# Patient Record
Sex: FEMALE | Race: WHITE | Employment: OTHER
[De-identification: names, ages, dates, MRNs, and addresses within clinical notes are randomized per-mention and may not be internally consistent; named-entity substitution may affect disease eponyms.]

---

## 2017-06-03 ENCOUNTER — HEALTH MAINTENANCE LETTER (OUTPATIENT)
Age: 57
End: 2017-06-03

## 2020-04-09 ENCOUNTER — MEDICAL CORRESPONDENCE (OUTPATIENT)
Facility: CLINIC | Age: 60
End: 2020-04-09

## 2020-04-09 ENCOUNTER — TRANSFERRED RECORDS (OUTPATIENT)
Dept: HEALTH INFORMATION MANAGEMENT | Facility: CLINIC | Age: 60
End: 2020-04-09

## 2020-04-10 ENCOUNTER — TELEPHONE (OUTPATIENT)
Dept: MEDSURG UNIT | Facility: CLINIC | Age: 60
End: 2020-04-10

## 2020-04-10 NOTE — TELEPHONE ENCOUNTER
COVID Telephone Screen    Step 1  Patient has been called and travel (COVID) screen has been completed. Yes    The patient is negative for symptoms (fever, cough, sore throat, rash): Yes    If patient is positive for symptoms, do not complete Step 2 and complete Step 3    Step 2  Patient informed of the no visitor policy: Yes  Patient informed to contact ordering provider if the patient develops symptoms (fever, cough, sore throat, rash) prior to procedure (ordering provider to determine if they can still have the procedure or need to reschedule): Yes    If patient is negative for symptoms, STOP here, do not complete Step 3.     Step 3  If the patient is positive for symptoms, consult the ordering provider and/or consult IP to determine if the procedure is deemed necessary or if procedure should be canceled or rescheduled.     If the patient procedure is deemed necessary and the patient is positive for new or worsening symptoms, notify the Manager/Supervisor. The patient should be instructed to call the department and wait by door 2 in their car. A team member in the appropriate PPE will bring a mask to the patient and room the patient directly. The patient will be registered via the phone.

## 2020-04-13 ENCOUNTER — HOSPITAL ENCOUNTER (OUTPATIENT)
Facility: CLINIC | Age: 60
Discharge: HOME | End: 2020-04-13
Admitting: PSYCHIATRY & NEUROLOGY
Payer: COMMERCIAL

## 2020-04-13 ENCOUNTER — APPOINTMENT (OUTPATIENT)
Dept: MRI IMAGING | Facility: CLINIC | Age: 60
End: 2020-04-13
Attending: EMERGENCY MEDICINE
Payer: COMMERCIAL

## 2020-04-13 ENCOUNTER — HOSPITAL ENCOUNTER (OUTPATIENT)
Dept: GENERAL RADIOLOGY | Facility: CLINIC | Age: 60
End: 2020-04-13
Attending: PSYCHIATRY & NEUROLOGY
Payer: COMMERCIAL

## 2020-04-13 ENCOUNTER — HOSPITAL ENCOUNTER (EMERGENCY)
Facility: CLINIC | Age: 60
Discharge: HOME | End: 2020-04-13
Attending: EMERGENCY MEDICINE | Admitting: EMERGENCY MEDICINE
Payer: COMMERCIAL

## 2020-04-13 VITALS
DIASTOLIC BLOOD PRESSURE: 51 MMHG | OXYGEN SATURATION: 99 % | RESPIRATION RATE: 16 BRPM | TEMPERATURE: 98.3 F | BODY MASS INDEX: 18.79 KG/M2 | HEART RATE: 67 BPM | SYSTOLIC BLOOD PRESSURE: 92 MMHG | WEIGHT: 120 LBS

## 2020-04-13 VITALS
HEART RATE: 60 BPM | SYSTOLIC BLOOD PRESSURE: 125 MMHG | OXYGEN SATURATION: 100 % | RESPIRATION RATE: 18 BRPM | DIASTOLIC BLOOD PRESSURE: 72 MMHG | TEMPERATURE: 96.1 F

## 2020-04-13 DIAGNOSIS — R51.9 CHRONIC INTRACTABLE HEADACHE, UNSPECIFIED HEADACHE TYPE: ICD-10-CM

## 2020-04-13 DIAGNOSIS — G03.9 MENINGITIS: ICD-10-CM

## 2020-04-13 DIAGNOSIS — R51.9 CHRONIC HEADACHE: ICD-10-CM

## 2020-04-13 DIAGNOSIS — G89.29 CHRONIC HEADACHE: ICD-10-CM

## 2020-04-13 DIAGNOSIS — C15.3 MALIGNANT NEOPLASM OF UPPER THIRD OF ESOPHAGUS (H): ICD-10-CM

## 2020-04-13 DIAGNOSIS — G89.29 CHRONIC INTRACTABLE HEADACHE, UNSPECIFIED HEADACHE TYPE: ICD-10-CM

## 2020-04-13 LAB
ANION GAP SERPL CALCULATED.3IONS-SCNC: 4 MMOL/L (ref 3–14)
APPEARANCE CSF: CLEAR
BASOPHILS # BLD AUTO: 0 10E9/L (ref 0–0.2)
BASOPHILS NFR BLD AUTO: 0.3 %
BUN SERPL-MCNC: 19 MG/DL (ref 7–30)
CALCIUM SERPL-MCNC: 8.7 MG/DL (ref 8.5–10.1)
CHLORIDE SERPL-SCNC: 104 MMOL/L (ref 94–109)
CO2 SERPL-SCNC: 28 MMOL/L (ref 20–32)
COLOR CSF: COLORLESS
CREAT SERPL-MCNC: 0.7 MG/DL (ref 0.52–1.04)
CRYPTOC AG SPEC QL: NORMAL
DIFFERENTIAL METHOD BLD: ABNORMAL
EOSINOPHIL # BLD AUTO: 0.1 10E9/L (ref 0–0.7)
EOSINOPHIL NFR BLD AUTO: 3.1 %
ERYTHROCYTE [DISTWIDTH] IN BLOOD BY AUTOMATED COUNT: 15 % (ref 10–15)
GFR SERPL CREATININE-BSD FRML MDRD: >90 ML/MIN/{1.73_M2}
GLUCOSE CSF-MCNC: 51 MG/DL (ref 40–70)
GLUCOSE SERPL-MCNC: 83 MG/DL (ref 70–99)
GRAM STN SPEC: NORMAL
HCT VFR BLD AUTO: 33.2 % (ref 35–47)
HGB BLD-MCNC: 10.5 G/DL (ref 11.7–15.7)
IMM GRANULOCYTES # BLD: 0 10E9/L (ref 0–0.4)
IMM GRANULOCYTES NFR BLD: 0 %
LYMPHOCYTES # BLD AUTO: 0.8 10E9/L (ref 0.8–5.3)
LYMPHOCYTES NFR BLD AUTO: 25.5 %
MCH RBC QN AUTO: 27.6 PG (ref 26.5–33)
MCHC RBC AUTO-ENTMCNC: 31.6 G/DL (ref 31.5–36.5)
MCV RBC AUTO: 87 FL (ref 78–100)
MONOCYTES # BLD AUTO: 0.2 10E9/L (ref 0–1.3)
MONOCYTES NFR BLD AUTO: 6.1 %
NEUTROPHILS # BLD AUTO: 2.1 10E9/L (ref 1.6–8.3)
NEUTROPHILS NFR BLD AUTO: 65 %
NRBC # BLD AUTO: 0 10*3/UL
NRBC BLD AUTO-RTO: 0 /100
PLATELET # BLD AUTO: 178 10E9/L (ref 150–450)
POTASSIUM SERPL-SCNC: 4 MMOL/L (ref 3.4–5.3)
PROT CSF-MCNC: 43 MG/DL (ref 15–60)
RBC # BLD AUTO: 3.81 10E12/L (ref 3.8–5.2)
RBC # CSF MANUAL: 0 /UL (ref 0–2)
SODIUM SERPL-SCNC: 136 MMOL/L (ref 133–144)
SPECIMEN SOURCE: NORMAL
SPECIMEN SOURCE: NORMAL
TUBE # CSF: 4 #
WBC # BLD AUTO: 3.3 10E9/L (ref 4–11)
WBC # CSF MANUAL: 0 /UL (ref 0–5)

## 2020-04-13 PROCEDURE — 82784 ASSAY IGA/IGD/IGG/IGM EACH: CPT

## 2020-04-13 PROCEDURE — 82784 ASSAY IGA/IGD/IGG/IGM EACH: CPT | Performed by: PSYCHIATRY & NEUROLOGY

## 2020-04-13 PROCEDURE — 84432 ASSAY OF THYROGLOBULIN: CPT | Performed by: PSYCHIATRY & NEUROLOGY

## 2020-04-13 PROCEDURE — 83873 ASSAY OF CSF PROTEIN: CPT

## 2020-04-13 PROCEDURE — 25800030 ZZH RX IP 258 OP 636: Performed by: EMERGENCY MEDICINE

## 2020-04-13 PROCEDURE — 88185 FLOWCYTOMETRY/TC ADD-ON: CPT | Performed by: PSYCHIATRY & NEUROLOGY

## 2020-04-13 PROCEDURE — 82164 ANGIOTENSIN I ENZYME TEST: CPT

## 2020-04-13 PROCEDURE — 62270 DX LMBR SPI PNXR: CPT

## 2020-04-13 PROCEDURE — 86316 IMMUNOASSAY TUMOR OTHER: CPT | Mod: GZ

## 2020-04-13 PROCEDURE — 86255 FLUORESCENT ANTIBODY SCREEN: CPT | Performed by: PSYCHIATRY & NEUROLOGY

## 2020-04-13 PROCEDURE — 88184 FLOWCYTOMETRY/ TC 1 MARKER: CPT | Performed by: PSYCHIATRY & NEUROLOGY

## 2020-04-13 PROCEDURE — 86334 IMMUNOFIX E-PHORESIS SERUM: CPT | Performed by: PSYCHIATRY & NEUROLOGY

## 2020-04-13 PROCEDURE — 25000128 H RX IP 250 OP 636: Performed by: EMERGENCY MEDICINE

## 2020-04-13 PROCEDURE — A9585 GADOBUTROL INJECTION: HCPCS | Performed by: EMERGENCY MEDICINE

## 2020-04-13 PROCEDURE — 86800 THYROGLOBULIN ANTIBODY: CPT | Performed by: PSYCHIATRY & NEUROLOGY

## 2020-04-13 PROCEDURE — 88108 CYTOPATH CONCENTRATE TECH: CPT

## 2020-04-13 PROCEDURE — 83520 IMMUNOASSAY QUANT NOS NONAB: CPT | Performed by: PSYCHIATRY & NEUROLOGY

## 2020-04-13 PROCEDURE — 87899 AGENT NOS ASSAY W/OPTIC: CPT

## 2020-04-13 PROCEDURE — 85025 COMPLETE CBC W/AUTO DIFF WBC: CPT | Performed by: EMERGENCY MEDICINE

## 2020-04-13 PROCEDURE — 87102 FUNGUS ISOLATION CULTURE: CPT

## 2020-04-13 PROCEDURE — 87015 SPECIMEN INFECT AGNT CONCNTJ: CPT

## 2020-04-13 PROCEDURE — 96367 TX/PROPH/DG ADDL SEQ IV INF: CPT | Mod: 59

## 2020-04-13 PROCEDURE — 99285 EMERGENCY DEPT VISIT HI MDM: CPT | Mod: 25

## 2020-04-13 PROCEDURE — 80048 BASIC METABOLIC PNL TOTAL CA: CPT | Performed by: EMERGENCY MEDICINE

## 2020-04-13 PROCEDURE — 82945 GLUCOSE OTHER FLUID: CPT

## 2020-04-13 PROCEDURE — 89050 BODY FLUID CELL COUNT: CPT

## 2020-04-13 PROCEDURE — 86255 FLUORESCENT ANTIBODY SCREEN: CPT | Mod: 59

## 2020-04-13 PROCEDURE — 25500064 ZZH RX 255 OP 636: Performed by: EMERGENCY MEDICINE

## 2020-04-13 PROCEDURE — 86788 WEST NILE VIRUS AB IGM: CPT

## 2020-04-13 PROCEDURE — 87116 MYCOBACTERIA CULTURE: CPT

## 2020-04-13 PROCEDURE — 87476 LYME DIS DNA AMP PROBE: CPT

## 2020-04-13 PROCEDURE — 86376 MICROSOMAL ANTIBODY EACH: CPT | Performed by: PSYCHIATRY & NEUROLOGY

## 2020-04-13 PROCEDURE — 82042 OTHER SOURCE ALBUMIN QUAN EA: CPT

## 2020-04-13 PROCEDURE — 40001004 ZZHCL STATISTIC FLOW INT 9-15 ABY TC 88188: Performed by: PSYCHIATRY & NEUROLOGY

## 2020-04-13 PROCEDURE — 87206 SMEAR FLUORESCENT/ACID STAI: CPT

## 2020-04-13 PROCEDURE — 84165 PROTEIN E-PHORESIS SERUM: CPT | Performed by: PSYCHIATRY & NEUROLOGY

## 2020-04-13 PROCEDURE — 96366 THER/PROPH/DIAG IV INF ADDON: CPT

## 2020-04-13 PROCEDURE — 88108 CYTOPATH CONCENTRATE TECH: CPT | Mod: 26

## 2020-04-13 PROCEDURE — 00000402 ZZHCL STATISTIC TOTAL PROTEIN: Performed by: PSYCHIATRY & NEUROLOGY

## 2020-04-13 PROCEDURE — 70544 MR ANGIOGRAPHY HEAD W/O DYE: CPT

## 2020-04-13 PROCEDURE — 84999 UNLISTED CHEMISTRY PROCEDURE: CPT

## 2020-04-13 PROCEDURE — 87529 HSV DNA AMP PROBE: CPT

## 2020-04-13 PROCEDURE — 82040 ASSAY OF SERUM ALBUMIN: CPT

## 2020-04-13 PROCEDURE — 83916 OLIGOCLONAL BANDS: CPT

## 2020-04-13 PROCEDURE — 86789 WEST NILE VIRUS ANTIBODY: CPT

## 2020-04-13 PROCEDURE — 96365 THER/PROPH/DIAG IV INF INIT: CPT | Mod: 59

## 2020-04-13 PROCEDURE — 00000102 ZZHCL STATISTIC CYTO WRIGHT STAIN TC

## 2020-04-13 PROCEDURE — 70546 MR ANGIOGRAPH HEAD W/O&W/DYE: CPT

## 2020-04-13 PROCEDURE — 96375 TX/PRO/DX INJ NEW DRUG ADDON: CPT | Mod: 59

## 2020-04-13 PROCEDURE — 88108 CYTOPATH CONCENTRATE TECH: CPT | Mod: 26 | Performed by: EMERGENCY MEDICINE

## 2020-04-13 PROCEDURE — 87070 CULTURE OTHR SPECIMN AEROBIC: CPT

## 2020-04-13 PROCEDURE — 96361 HYDRATE IV INFUSION ADD-ON: CPT | Mod: 59

## 2020-04-13 PROCEDURE — 87205 SMEAR GRAM STAIN: CPT

## 2020-04-13 PROCEDURE — 25000125 ZZHC RX 250: Performed by: EMERGENCY MEDICINE

## 2020-04-13 PROCEDURE — 40000863 ZZH STATISTIC RADIOLOGY XRAY, US, CT, MAR, NM

## 2020-04-13 PROCEDURE — 36415 COLL VENOUS BLD VENIPUNCTURE: CPT | Performed by: PSYCHIATRY & NEUROLOGY

## 2020-04-13 PROCEDURE — 83519 RIA NONANTIBODY: CPT | Performed by: PSYCHIATRY & NEUROLOGY

## 2020-04-13 PROCEDURE — 84157 ASSAY OF PROTEIN OTHER: CPT

## 2020-04-13 RX ORDER — ONDANSETRON 8 MG/1
8 TABLET, ORALLY DISINTEGRATING ORAL EVERY 8 HOURS PRN
COMMUNITY
End: 2023-05-15

## 2020-04-13 RX ORDER — POLYETHYLENE GLYCOL 3350 17 G/17G
17 POWDER, FOR SOLUTION ORAL 2 TIMES DAILY
COMMUNITY

## 2020-04-13 RX ORDER — KETOROLAC TROMETHAMINE 15 MG/ML
15 INJECTION, SOLUTION INTRAMUSCULAR; INTRAVENOUS ONCE
Status: COMPLETED | OUTPATIENT
Start: 2020-04-13 | End: 2020-04-13

## 2020-04-13 RX ORDER — METOCLOPRAMIDE HYDROCHLORIDE 5 MG/ML
10 INJECTION INTRAMUSCULAR; INTRAVENOUS ONCE
Status: COMPLETED | OUTPATIENT
Start: 2020-04-13 | End: 2020-04-13

## 2020-04-13 RX ORDER — ACETAMINOPHEN 500 MG
TABLET ORAL
COMMUNITY

## 2020-04-13 RX ORDER — GADOBUTROL 604.72 MG/ML
10 INJECTION INTRAVENOUS ONCE
Status: COMPLETED | OUTPATIENT
Start: 2020-04-13 | End: 2020-04-13

## 2020-04-13 RX ORDER — TRAMADOL HYDROCHLORIDE 50 MG/1
50 TABLET ORAL EVERY 6 HOURS PRN
COMMUNITY
End: 2023-05-15

## 2020-04-13 RX ORDER — CELECOXIB 200 MG/1
200 CAPSULE ORAL 2 TIMES DAILY
COMMUNITY
End: 2023-06-02

## 2020-04-13 RX ORDER — ALBUTEROL SULFATE 90 UG/1
1 AEROSOL, METERED RESPIRATORY (INHALATION) EVERY 4 HOURS PRN
COMMUNITY

## 2020-04-13 RX ORDER — SODIUM CHLORIDE 9 MG/ML
1000 INJECTION, SOLUTION INTRAVENOUS CONTINUOUS
Status: DISCONTINUED | OUTPATIENT
Start: 2020-04-13 | End: 2020-04-13 | Stop reason: HOSPADM

## 2020-04-13 RX ORDER — DESMOPRESSIN ACETATE 0.1 MG/1
TABLET ORAL
COMMUNITY

## 2020-04-13 RX ORDER — METOCLOPRAMIDE 10 MG/1
10 TABLET ORAL 3 TIMES DAILY PRN
Qty: 30 TABLET | Refills: 0 | Status: SHIPPED | OUTPATIENT
Start: 2020-04-13 | End: 2023-05-15

## 2020-04-13 RX ORDER — DEXAMETHASONE SODIUM PHOSPHATE 10 MG/ML
10 INJECTION, SOLUTION INTRAMUSCULAR; INTRAVENOUS ONCE
Status: COMPLETED | OUTPATIENT
Start: 2020-04-13 | End: 2020-04-13

## 2020-04-13 RX ORDER — SENNOSIDES 8.6 MG
2 TABLET ORAL DAILY
COMMUNITY
End: 2023-06-02

## 2020-04-13 RX ORDER — HEPARIN SODIUM (PORCINE) LOCK FLUSH IV SOLN 100 UNIT/ML 100 UNIT/ML
500 SOLUTION INTRAVENOUS ONCE
Status: COMPLETED | OUTPATIENT
Start: 2020-04-13 | End: 2020-04-13

## 2020-04-13 RX ORDER — DIPHENHYDRAMINE HYDROCHLORIDE 50 MG/ML
25 INJECTION INTRAMUSCULAR; INTRAVENOUS ONCE
Status: COMPLETED | OUTPATIENT
Start: 2020-04-13 | End: 2020-04-13

## 2020-04-13 RX ADMIN — METOCLOPRAMIDE 10 MG: 5 INJECTION, SOLUTION INTRAMUSCULAR; INTRAVENOUS at 15:46

## 2020-04-13 RX ADMIN — KETOROLAC TROMETHAMINE 15 MG: 15 INJECTION, SOLUTION INTRAMUSCULAR; INTRAVENOUS at 15:44

## 2020-04-13 RX ADMIN — GADOBUTROL 10 ML: 604.72 INJECTION INTRAVENOUS at 17:02

## 2020-04-13 RX ADMIN — DEXAMETHASONE SODIUM PHOSPHATE 10 MG: 10 INJECTION, SOLUTION INTRAMUSCULAR; INTRAVENOUS at 15:47

## 2020-04-13 RX ADMIN — VALPROATE SODIUM 500 MG: 100 INJECTION, SOLUTION INTRAVENOUS at 19:36

## 2020-04-13 RX ADMIN — SODIUM CHLORIDE 1000 ML: 9 INJECTION, SOLUTION INTRAVENOUS at 15:13

## 2020-04-13 RX ADMIN — HEPARIN SODIUM (PORCINE) LOCK FLUSH IV SOLN 100 UNIT/ML 500 UNITS: 100 SOLUTION at 20:23

## 2020-04-13 RX ADMIN — DIPHENHYDRAMINE HYDROCHLORIDE 25 MG: 50 INJECTION, SOLUTION INTRAMUSCULAR; INTRAVENOUS at 15:44

## 2020-04-13 RX ADMIN — MAGNESIUM SULFATE HEPTAHYDRATE 1 G: 500 INJECTION, SOLUTION INTRAMUSCULAR; INTRAVENOUS at 16:44

## 2020-04-13 ASSESSMENT — ENCOUNTER SYMPTOMS
FEVER: 0
DIZZINESS: 1
NECK STIFFNESS: 0
NAUSEA: 1
HEADACHES: 1

## 2020-04-13 NOTE — PROGRESS NOTES
Care Suites Discharge Nursing Note    Patient Information  Name: Yoselin Thomas  Age: 60 year old    Discharge Education:  Discharge instructions reviewed: Yes  Additional education/resources provided: na  Patient/patient representative verbalizes understanding: Yes  Patient discharging on new medications: No  Medication education completed: N/A    Discharge Plans:   Discharge location: to ER per Jt CERVANTES  Discharge ride contacted: Yes pt contacted her  to let him know she is going to the ER for pain control  Approximate discharge time: 1321 from care suites, being wheeled via cart to ED #3 per dieudonne salmon    Discharge Criteria:  Discharge criteria met and vital signs stable: No, explain going to ER for pain control    Patient Belongs:  Patient belongings returned to patient: Yes, all belongings on cart for pt, lying flat. Site CDI. Pain at 10    Marifer Sanchez RN

## 2020-04-13 NOTE — PROGRESS NOTES
Care Suites Post Procedure Note    Patient Information  Name: Yoselin Thomas  Age: 60 year old    Post Procedure  Procedure: LP  Time patient returned to Care Suites: 1300  Concerns/abnormal assessment: VSS. LP site CDi with bandaid. Pt headache at 10. Per Jt CERVANTES pt to go to ER for pain control, Jt spoke with ER drKenton   If abnormal assessment, provider notified: Yes, pain at 10 , see above  Plan/Other: will call ER rn report and then we can transfer to ER asap per Jt no need to keep her here in care suites for the one hour bedrest, she can go flat on her cart to ER.    Marifer Sanchez RN

## 2020-04-13 NOTE — PROGRESS NOTES
Care Suites Admission Nursing Note    Patient Information  Name: Yoselin Thomas  Age: 60 year old  Reason for admission: Lumbar puncture  Care Suites arrival time: 10:15 am    Patient Admission/Assessment   Pre-procedure assessment complete: Yes  If abnormal assessment/labs, provider notified: N/A  NPO: N/A  Medications held per instructions/orders: Yes  Consent: deferred  If applicable, pregnancy test status: N/A  Patient oriented to room: Yes  Education/questions answered: Yes  Plan/other: Lumbar puncture in radiology then return to Care Suites    Discharge Planning  Accompanied by: Self  Discharge name/phone number: Riccardo, spouse [phone]  Overnight caregiver: Riccardo  Discharge location: home

## 2020-04-13 NOTE — ED PROVIDER NOTES
History     Chief Complaint:  Headache    HPI   Mary BethKaleigh Thomas is a 60 year old female who presents with several months of a headache. She has had a MRI in the [facility] system with results below and is currently working with Dr. Up from neurology regarding her ongoing symptoms. She denies fever or neck stiffness. Relays associated nausea as well as dizziness which has increased over the last 3 days. Given the ongoing significant headaches, she presents here for symptom control. There are no further alleviating or aggravating symptoms. No further associated symptoms.     MR HEAD BRAIN WWO 3/20/2020  1.  No change compared to 04/05/2018.  2.  Nonspecific thickening/enhancement of the infundibulum.  3.  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 No other discrete pituitary nodule.  4.  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  Smoking Status: Never Smoker  Smokeless Tobacco: Never Used  Alcohol Use: Negative  Drug Use: Negative  PCP: Jody Mchugh   Marital Status:        Review of Systems   Constitutional: Negative for fever.    Gastrointestinal: Positive for nausea.   Musculoskeletal: Negative for neck stiffness.   Neurological: Positive for dizziness and headaches.   All other systems reviewed and are negative.    Physical Exam     Patient Vitals for the past 24 hrs:   BP Temp Temp src Pulse Heart Rate Resp SpO2 Weight   04/13/20 1601 -- -- -- -- -- -- -- 54.4 kg (120 lb)   04/13/20 1400 121/78 -- -- 56 -- -- 100 % --   04/13/20 1332 130/82 98.3  F (36.8  C) Oral 62 62 16 100 % --      Physical Exam    General: Alert, interactive in mild distress  Head:  Scalp is atraumatic  Eyes:  The pupils are equal, round, and reactive to light    EOM's intact    No scleral icterus  ENT:      Nose:  The external nose is normal  Ears:  External ears are normal  Mouth/Throat: The oropharynx is normal    Mucus membranes are moist      Neck:  Normal range of motion.      There is no rigidity.    Trachea is in the midline         CV:  Regular rate and rhythm    No murmur   Resp:  Breath sounds are clear bilaterally    Non-labored, no retractions or accessory muscle use        MS:  Normal strength in all 4 extremities    Port in right chest  Skin:  Warm and dry, No rash or lesions noted.  Neuro: Strength 5/5 x4.  Sensation intact  In all 4 extremities.      Cranial nerves 2-12 grossly intact.    GCS: 15  Psych:  Awake. Alert.  Normal affect.      Appropriate interactions.    Emergency Department Course     Imaging:  Radiology findings were communicated with the patient who voiced understanding of the findings.    MRA Brain ([place]) wo Contrast      Results Pending    MRV Brain wo & w Contrast      Results Pending    Laboratory:  Laboratory findings were communicated with the patient who voiced understanding of the findings.    CBC: WBC 3.3 (L), HGB 10.5 (L),   BMP:  WNL (Creatinine 0.70)    Protein Total CSF: 43   Glucose CSF: 51  Cell Count with Differential CSF: WBC CSF 0, RBC CSF 0, Color CSF colorless, Appearance clear  Gram Stain: no  organisms seen     AFB Stain Non Blood: Pending   AFB Culture Non Blood: Pending   Fungus Culture: Pending   Angiotensin Converting Enzyme CSF: Pending   West Nile Virus IgG and IgM CSF: Pending   HSV Types 1 and 2 Qualitative PCR CSF: Pending   Myelin basic protein CSF: Pending   Neuron Specific Enolase CSF: Pending   Protein electrophoresis: Pending   Protein Immunofixation Serum: Pending   Paraneoplastic antibody:Pending   Thyroglobulin and Antibody: Pending   Thyroid peroxidase antibody: Pending   Leukemia Lymphoma Evaluation CSF: Pending   Cytology non gyn: Pending   Cryptococcus antigen: Pending   Oligoclonal banding: Pending    Mar Lin Miscellaneous Test: Pending   Lyme disease DNA detection by PCR:     Interventions:  1513 NS 1000 mL IV  1544 Toradol 15 mg IV  1544 Benadryl 25 mg IV  1546 Reglan 10 mg IV  1547 Decadron 10 mg IV   1644 magnesium sulfate 1 g IV   1702 Gadavist 10 mL IV     Emergency Department Course:     Nursing notes and vitals reviewed. I performed an exam of the patient as documented above.     1506 IV was inserted and blood was drawn for laboratory testing, results above.     1600 I spoke with Dr. Up of the neurology service regarding patient's presentation, findings, and plan of care. He recommends MRa, MRv.     1659 The patient was sent for a MR while in the emergency department, results above.       Prior to sign out, I personally reviewed the results with the patient and all related questions were answered. The patient verbalized understanding and is amenable to plan.     Impression & Plan      Medical Decision Making:  Yoselin Thomas is a 60 year old female who presents to the emergency department today for evaluation of 2 months of a headache.  The above work-up was undertaken here and I reviewed outpatient records as well as her CSF results from earlier today.  Her headache is quite difficult to control and she had minimal relief despite medications above.  I spoke  with her neurologist who is recommended an MRA MRV which is pending at this time and will be followed up by Dr. Orellana.  If the patient is feeling improved she can be discharged home and I have prescribed metoclopramide.  If her symptoms persist she may require hospitalization for further evaluation and treatment of her symptoms.  There is no signs of acute meningitis, intracranial hemorrhage, or more concerning illness.    Diagnosis:    ICD-10-CM    1. Chronic intractable headache, unspecified headache type  R51 Protein electrophoresis     Disposition:   The patient is signed out to my colleague, Dr. Scherer, pending MRv and MRa.     Discharge Medications:  New Prescriptions    METOCLOPRAMIDE (REGLAN) 10 MG TABLET    Take 1 tablet (10 mg) by mouth 3 times daily as needed (Headache)     Scribe Disclosure:  I, Orla Severson, am serving as a scribe at 2:09 PM on 4/13/2020 to document services personally performed by Jorge A Byesr MD based on my observations and the provider's statements to me.    EMERGENCY DEPARTMENT       Jorge A Byers MD  04/13/20 1830

## 2020-04-13 NOTE — PROGRESS NOTES
RADIOLOGY PROCEDURE NOTE  Patient name: Yoselin Thomas  MRN: [id number]  : 1960    Pre-procedure diagnosis: Headaches  Post-procedure diagnosis: Same    Procedure Date/Time: 2020  12:38 PM  Procedure: LP  Estimated blood loss: None  Specimen(s) collected with description: 23 ml of clear fluid  The patient tolerated the procedure well with no immediate complications.  Significant findings:opening pressure of 25 cm of water.    Pt has had significant headaches and using tramadol without success prior to admission. Requesting evaluation and possible admit for pain control.  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    See imaging dictation for procedural details.    Provider name: Jt Collazo PA-C  Assistant(s):None

## 2020-04-13 NOTE — PROGRESS NOTES
PATIENT WELLNESS SCREENING    Step 1: Answer all screening questions 1-3.    1. In the last month, have you been in contact with someone who was confirmed or suspected to have Coronavirus/COVID-19? No    2. Do you have the following symptoms?   Fever? No   Cough? No   Shortness of breath? No   Skin rash? No    3. Have you traveled internationally in the last month? No       Step 2: Refer to logic grid below for actions    NO SYMPTOM(S)    ACTIONS:  1. Standard rooming process  2. Provider to assess per normal protocol    POSITIVE SYMPTOM(S)  If positive for ANY of the following symptoms: fever, cough, shortness of breath, rash    ACTIONS  1. Mask patient  2. Room patient as soon as possible  3. Don appropriate PPE when entering room  4. Provider evaluation

## 2020-04-13 NOTE — ED NOTES
Verified patient's power port from card patient carried.  Copy made and placed in basket outside patient's room.

## 2020-04-13 NOTE — ED AVS SNAPSHOT
Emergency Department  [address] [facility] [address]  Phone:  [phone]  Fax:  [phone]                                    Yoselin Thomas   MRN: [id number]    Department:   Emergency Department   Date of Visit:  4/13/2020           After Visit Summary Signature Page    I have received my discharge instructions, and my questions have been answered. I have discussed any challenges I see with this plan with the nurse or doctor.    ..........................................................................................................................................  Patient/Patient Representative Signature      ..........................................................................................................................................  Patient Representative Print Name and Relationship to Patient    ..................................................               ................................................  Date                                   Time    ..........................................................................................................................................  Reviewed by Signature/Title    ...................................................              ..............................................  Date                                               Time          22EPIC Rev 08/18

## 2020-04-13 NOTE — DISCHARGE INSTRUCTIONS
Lumbar Puncture Discharge Instructions     After you go home:      You may resume your normal diet    Continue to drink at least 8 ounces of fluid every 1-2 hours until bedtime tonight and continue to drink extra fluids for the next 2 days    Caffeinated beverages may help prevent or reduce spinal headaches    Care of Puncture Site:      If there is a bandaid - you may remove it tomorrow morning    You may shower tomorrow    No tub baths, whirlpools or swimming for 48 hours     Activity - to help prevent spinal headache or spinal fluid leakage:      Minimize your activity today. Flat bedrest for 24 hrs is strongly suggested as this will help to prevent a spinal headache.  You can be up to the bathroom and for meals.    Resume normal activities tomorrow.     Avoid strenuous activity for the next 2 days    Do not drive a vehicle until tomorrow morning    Medicines:      You may resume all medications    For minor pain, you may take Acetaminophen (Tylenol) or Ibuprofen (Advil)            Call the provider who ordered this procedure if:      Your headache becomes worse or is severe. (A minor headache is not unusual)    You have nausea or vomiting    The site is red, swollen, hot or tender    You have chills or a fever greater than 101 F (38 C)    Any questions or concerns    If you have questions call:        [facility] Radiology Dept @ [phone]    The provider who performed your procedure was _________________.

## 2020-04-14 LAB
ACE CSF-CCNC: 2.2 U/L (ref 0–2.5)
ALBUMIN SERPL ELPH-MCNC: 4.3 G/DL (ref 3.7–5.1)
ALPHA1 GLOB SERPL ELPH-MCNC: 0.3 G/DL (ref 0.2–0.4)
ALPHA2 GLOB SERPL ELPH-MCNC: 0.7 G/DL (ref 0.5–0.9)
B-GLOBULIN SERPL ELPH-MCNC: 0.7 G/DL (ref 0.6–1)
COPATH REPORT: NORMAL
COPATH REPORT: NORMAL
GAMMA GLOB SERPL ELPH-MCNC: 0.9 G/DL (ref 0.7–1.6)
HSV1 DNA CSF QL NAA+PROBE: NOT DETECTED
HSV2 DNA CSF QL NAA+PROBE: NOT DETECTED
M PROTEIN SERPL ELPH-MCNC: 0 G/DL
MICROBIOLOGIST REVIEW: NORMAL
PROT PATTERN SERPL ELPH-IMP: NORMAL
THYROPEROXIDASE AB SERPL-ACNC: 11 IU/ML

## 2020-04-14 NOTE — ED PROVIDER NOTES
ED course:  Patient received as a handoff from my partner Dr. Byers.  On face-to-face evaluation patient reports some improvement in her headache and feels well enough for discharge.  Imaging results as noted below demonstrated no evidence of acute pathology.  Patient to follow-up closely with neurologist.  Return precautions discussed.  Recommended continued supportive care.  Please see Dr. Byers's note for additional details.    MRV Brain wo & w Contrast   Final Result   IMPRESSION: Moderate short segment narrowing of the right transverse   sinus. This is favored to represent sequela of a nonocclusive thrombus   or physiologic variation. Comparison to prior MRI would be helpful.      GALLO GRANT MD      MRA Brain ([place]) wo Contrast   Final Result   IMPRESSION:   1. Left-sided persistent trigeminal artery, considered to be   incidental normal variant.   2. Otherwise unremarkable MRA of the head.      GALLO GRANT MD          Impression:    ICD-10-CM    1. Chronic intractable headache, unspecified headache type  R51 Protein electrophoresis       Disposition:  Discharge         Brady Orellana,   04/13/20 3147

## 2020-04-15 LAB
ALB CSF/SERPL: 5.2 RATIO (ref 0–9)
ALBUMIN CSF-MCNC: 21 MG/DL (ref 0–35)
ALBUMIN SERPL-MCNC: 4050 MG/DL (ref 3500–5200)
IGA SERPL-MCNC: 119 MG/DL (ref 84–499)
IGG CSF-MCNC: 2.3 MG/DL (ref 0–6)
IGG SERPL-MCNC: 924 MG/DL (ref 768–1632)
IGG SERPL-MCNC: 940 MG/DL (ref 610–1616)
IGG SYNTH RATE SER+CSF CALC-MRATE: <0 MG/D
IGG/ALB CLEAR SER+CSF-RTO: 0.48 RATIO (ref 0.28–0.66)
IGG/ALB CSF: 0.11 RATIO (ref 0.09–0.25)
IGM SERPL-MCNC: 123 MG/DL (ref 35–242)
NSE CSF IA-MCNC: 8.7 UG/L (ref 1–7)
OLIGOCLONAL BANDS CSF ELPH-IMP: NEGATIVE
OLIGOCLONAL BANDS CSF ELPH-IMP: NORMAL
OLIGOCLONAL BANDS CSF IEF: 0 BANDS (ref 0–1)
PROT PATTERN SERPL IFE-IMP: NORMAL
WNV IGG CSF-ACNC: 0.04 IV
WNV IGM CSF-ACNC: 0 IV

## 2020-04-16 LAB
ACID FAST STN SPEC QL: NORMAL
ACID FAST STN SPEC QL: NORMAL
B BURGDOR DNA SPEC QL NAA+PROBE: NOT DETECTED
MBP CSF-MCNC: 4.68 NG/ML (ref 0–5.5)
SPECIMEN SOURCE: NORMAL
SPECIMEN SOURCE: NORMAL

## 2020-04-18 LAB
BACTERIA SPEC CULT: NO GROWTH
PNP ABY SERUM: NORMAL
SPECIMEN SOURCE: NORMAL

## 2020-04-18 NOTE — RESULT ENCOUNTER NOTE
Resulted after Emergency Dept visit  Routed to Patient's neurologist, Dr Annel Cordero, RN  [facility]  Emergency Dept Lab Result RN  Ph# [phone]

## 2020-04-20 LAB — LAB SCANNED RESULT: NORMAL

## 2020-04-21 LAB
RESULT: NORMAL
SEND OUTS MISC TEST CODE: NORMAL
SEND OUTS MISC TEST SPECIMEN: NORMAL
TEST NAME: NORMAL

## 2020-05-11 LAB
FUNGUS SPEC CULT: NORMAL
SPECIMEN SOURCE: NORMAL

## 2020-06-11 LAB
MYCOBACTERIUM SPEC CULT: NORMAL
MYCOBACTERIUM SPEC CULT: NORMAL
SPECIMEN SOURCE: NORMAL

## 2023-05-14 VITALS
SYSTOLIC BLOOD PRESSURE: 102 MMHG | TEMPERATURE: 97.5 F | WEIGHT: 144.6 LBS | RESPIRATION RATE: 16 BRPM | BODY MASS INDEX: 22.65 KG/M2 | DIASTOLIC BLOOD PRESSURE: 68 MMHG | OXYGEN SATURATION: 96 % | HEART RATE: 78 BPM

## 2023-05-14 RX ORDER — DULOXETIN HYDROCHLORIDE 30 MG/1
30 CAPSULE, DELAYED RELEASE ORAL DAILY
COMMUNITY
End: 2023-06-02

## 2023-05-14 RX ORDER — PREGABALIN 75 MG/1
75 CAPSULE ORAL 2 TIMES DAILY
COMMUNITY

## 2023-05-14 RX ORDER — METHOCARBAMOL 500 MG/1
500 TABLET, FILM COATED ORAL 4 TIMES DAILY PRN
COMMUNITY
End: 2023-06-02

## 2023-05-14 RX ORDER — ROSUVASTATIN CALCIUM 5 MG/1
5 TABLET, COATED ORAL DAILY
COMMUNITY
End: 2023-06-02

## 2023-05-14 RX ORDER — LIDOCAINE 4 G/G
PATCH TOPICAL EVERY 24 HOURS
COMMUNITY
End: 2023-06-02

## 2023-05-14 RX ORDER — PREGABALIN 100 MG/1
100 CAPSULE ORAL AT BEDTIME
COMMUNITY
End: 2023-06-02

## 2023-05-14 RX ORDER — CARBOXYMETHYLCELLULOSE SODIUM 5 MG/ML
2 SOLUTION/ DROPS OPHTHALMIC 2 TIMES DAILY
COMMUNITY
End: 2023-06-02

## 2023-05-14 RX ORDER — BUPRENORPHINE 5 UG/H
1 PATCH TRANSDERMAL
COMMUNITY
End: 2023-05-22 | Stop reason: DRUGHIGH

## 2023-05-14 RX ORDER — BENZONATATE 100 MG/1
100 CAPSULE ORAL 3 TIMES DAILY PRN
COMMUNITY

## 2023-05-14 RX ORDER — HYDROMORPHONE HYDROCHLORIDE 2 MG/1
2 TABLET ORAL EVERY 4 HOURS PRN
COMMUNITY
End: 2023-05-15

## 2023-05-14 RX ORDER — PETROLATUM 42 G/100G
OINTMENT TOPICAL 2 TIMES DAILY
COMMUNITY
End: 2023-06-02

## 2023-05-14 RX ORDER — MECLIZINE HYDROCHLORIDE 25 MG/1
25 TABLET ORAL EVERY 8 HOURS
COMMUNITY
End: 2023-06-02

## 2023-05-14 RX ORDER — ONDANSETRON 4 MG/1
4 TABLET, FILM COATED ORAL EVERY 8 HOURS PRN
COMMUNITY
End: 2023-06-02

## 2023-05-14 RX ORDER — HYDROXYZINE PAMOATE 50 MG/1
50 CAPSULE ORAL EVERY 4 HOURS PRN
COMMUNITY
Start: 2023-05-12 | End: 2023-05-26

## 2023-05-14 RX ORDER — ESTRADIOL 0.1 MG/G
CREAM VAGINAL
COMMUNITY

## 2023-05-14 RX ORDER — MONTELUKAST SODIUM 10 MG/1
10 TABLET ORAL AT BEDTIME
COMMUNITY

## 2023-05-14 RX ORDER — FAMOTIDINE 40 MG/1
40 TABLET, FILM COATED ORAL DAILY
COMMUNITY

## 2023-05-14 RX ORDER — RIZATRIPTAN BENZOATE 10 MG/1
10 TABLET ORAL
COMMUNITY

## 2023-05-14 RX ORDER — COLCHICINE 0.6 MG/1
0.6 TABLET ORAL 3 TIMES DAILY
COMMUNITY

## 2023-05-15 ENCOUNTER — TRANSITIONAL CARE UNIT VISIT (OUTPATIENT)
Dept: GERIATRICS | Facility: CLINIC | Age: 63
End: 2023-05-15
Payer: MEDICARE

## 2023-05-15 DIAGNOSIS — R42 VERTIGO: ICD-10-CM

## 2023-05-15 DIAGNOSIS — R52 PAIN: Primary | ICD-10-CM

## 2023-05-15 DIAGNOSIS — T07.XXXA MULTIPLE FRACTURES: ICD-10-CM

## 2023-05-15 DIAGNOSIS — R51.0 POSITIONAL HEADACHE: ICD-10-CM

## 2023-05-15 DIAGNOSIS — E23.2 CENTRAL DIABETES INSIPIDUS DUE TO TRAUMATIC EVENT (H): ICD-10-CM

## 2023-05-15 DIAGNOSIS — J45.21 MILD INTERMITTENT ASTHMA WITH ACUTE EXACERBATION: ICD-10-CM

## 2023-05-15 DIAGNOSIS — M35.2 BEHCET'S DISEASE (H): ICD-10-CM

## 2023-05-15 DIAGNOSIS — K21.9 GASTROESOPHAGEAL REFLUX DISEASE WITHOUT ESOPHAGITIS: ICD-10-CM

## 2023-05-15 DIAGNOSIS — R33.9 URINARY RETENTION: ICD-10-CM

## 2023-05-15 DIAGNOSIS — E43 SEVERE MALNUTRITION (H): ICD-10-CM

## 2023-05-15 DIAGNOSIS — R53.81 PHYSICAL DECONDITIONING: ICD-10-CM

## 2023-05-15 DIAGNOSIS — S51.002D OPEN WOUND OF LEFT ELBOW, SUBSEQUENT ENCOUNTER: ICD-10-CM

## 2023-05-15 DIAGNOSIS — S06.9X9D TRAUMATIC BRAIN INJURY WITH LOSS OF CONSCIOUSNESS, SUBSEQUENT ENCOUNTER: ICD-10-CM

## 2023-05-15 DIAGNOSIS — Z86.711 HISTORY OF PULMONARY EMBOLISM: ICD-10-CM

## 2023-05-15 DIAGNOSIS — Z87.828 HISTORY OF MOTOR VEHICLE ACCIDENT: ICD-10-CM

## 2023-05-15 DIAGNOSIS — E78.5 HYPERLIPIDEMIA, UNSPECIFIED HYPERLIPIDEMIA TYPE: ICD-10-CM

## 2023-05-15 DIAGNOSIS — E22.2 SIADH (SYNDROME OF INAPPROPRIATE ADH PRODUCTION) (H): Primary | ICD-10-CM

## 2023-05-15 PROCEDURE — 99309 SBSQ NF CARE MODERATE MDM 30: CPT | Performed by: NURSE PRACTITIONER

## 2023-05-15 RX ORDER — HYDROMORPHONE HYDROCHLORIDE 2 MG/1
2 TABLET ORAL EVERY 4 HOURS PRN
Qty: 30 TABLET | Refills: 0 | Status: SHIPPED | OUTPATIENT
Start: 2023-05-15 | End: 2023-05-27
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
## 2023-05-16 ENCOUNTER — TRANSITIONAL CARE UNIT VISIT (OUTPATIENT)
Dept: GERIATRICS | Facility: CLINIC | Age: 63
End: 2023-05-16
Payer: MEDICARE

## 2023-05-16 ENCOUNTER — LAB REQUISITION (OUTPATIENT)
Dept: LAB | Facility: CLINIC | Age: 63
End: 2023-05-16

## 2023-05-16 VITALS
WEIGHT: 144.6 LBS | SYSTOLIC BLOOD PRESSURE: 123 MMHG | TEMPERATURE: 96.6 F | BODY MASS INDEX: 22.7 KG/M2 | RESPIRATION RATE: 16 BRPM | OXYGEN SATURATION: 99 % | HEIGHT: 67 IN | HEART RATE: 82 BPM | DIASTOLIC BLOOD PRESSURE: 76 MMHG

## 2023-05-16 DIAGNOSIS — T07.XXXA MULTIPLE FRACTURES: Primary | ICD-10-CM

## 2023-05-16 DIAGNOSIS — E87.1 HYPO-OSMOLALITY AND HYPONATREMIA: ICD-10-CM

## 2023-05-16 PROCEDURE — 99207 PR NO CHARGE LOS: CPT | Performed by: PHYSICIAN ASSISTANT
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
## 2023-05-17 ENCOUNTER — TRANSITIONAL CARE UNIT VISIT (OUTPATIENT)
Dept: GERIATRICS | Facility: CLINIC | Age: 63
End: 2023-05-17
Payer: MEDICARE

## 2023-05-17 ENCOUNTER — LAB REQUISITION (OUTPATIENT)
Dept: LAB | Facility: CLINIC | Age: 63
End: 2023-05-17

## 2023-05-17 ENCOUNTER — HOSPITAL ENCOUNTER (OUTPATIENT)
Dept: WOUND CARE | Facility: CLINIC | Age: 63
Discharge: HOME | End: 2023-05-17
Attending: PHYSICIAN ASSISTANT | Admitting: PHYSICIAN ASSISTANT
Payer: COMMERCIAL

## 2023-05-17 DIAGNOSIS — E22.2 SIADH (SYNDROME OF INAPPROPRIATE ADH PRODUCTION) (H): Primary | ICD-10-CM

## 2023-05-17 DIAGNOSIS — T07.XXXA MULTIPLE TRAUMA: ICD-10-CM

## 2023-05-17 DIAGNOSIS — R53.81 PHYSICAL DECONDITIONING: ICD-10-CM

## 2023-05-17 DIAGNOSIS — M35.2 BEHCET'S DISEASE (H): ICD-10-CM

## 2023-05-17 DIAGNOSIS — H81.10 BENIGN PAROXYSMAL POSITIONAL VERTIGO, UNSPECIFIED LATERALITY: ICD-10-CM

## 2023-05-17 DIAGNOSIS — Z85.01 HISTORY OF ESOPHAGEAL CANCER: ICD-10-CM

## 2023-05-17 DIAGNOSIS — K59.01 SLOW TRANSIT CONSTIPATION: ICD-10-CM

## 2023-05-17 DIAGNOSIS — G44.309 POST-TRAUMATIC HEADACHE, NOT INTRACTABLE, UNSPECIFIED CHRONICITY PATTERN: ICD-10-CM

## 2023-05-17 DIAGNOSIS — E78.5 DYSLIPIDEMIA: ICD-10-CM

## 2023-05-17 DIAGNOSIS — E43 SEVERE MALNUTRITION (H): ICD-10-CM

## 2023-05-17 DIAGNOSIS — F43.10 PTSD (POST-TRAUMATIC STRESS DISORDER): ICD-10-CM

## 2023-05-17 DIAGNOSIS — L98.492 ULCER OF ELBOW WITH FAT LAYER EXPOSED (H): Primary | ICD-10-CM

## 2023-05-17 DIAGNOSIS — Z11.1 ENCOUNTER FOR SCREENING FOR RESPIRATORY TUBERCULOSIS: ICD-10-CM

## 2023-05-17 DIAGNOSIS — J45.20 MILD INTERMITTENT ASTHMA WITHOUT COMPLICATION: ICD-10-CM

## 2023-05-17 DIAGNOSIS — I73.9 PAD (PERIPHERAL ARTERY DISEASE) (H): ICD-10-CM

## 2023-05-17 DIAGNOSIS — Z86.711 HISTORY OF PULMONARY EMBOLISM: ICD-10-CM

## 2023-05-17 DIAGNOSIS — K21.9 GASTROESOPHAGEAL REFLUX DISEASE, UNSPECIFIED WHETHER ESOPHAGITIS PRESENT: ICD-10-CM
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
## 2023-05-17 DIAGNOSIS — F41.9 ANXIETY: ICD-10-CM

## 2023-05-17 DIAGNOSIS — E23.0 LYMPHOCYTIC HYPOPHYSITIS (H): ICD-10-CM

## 2023-05-17 DIAGNOSIS — E23.2 DIABETES INSIPIDUS (H): ICD-10-CM

## 2023-05-17 LAB — SODIUM SERPL-SCNC: 140 MMOL/L (ref 136–145)

## 2023-05-17 PROCEDURE — 99306 1ST NF CARE HIGH MDM 50: CPT | Performed by: INTERNAL MEDICINE

## 2023-05-17 PROCEDURE — 97597 DBRDMT OPN WND 1ST 20 CM/<: CPT | Performed by: PHYSICIAN ASSISTANT

## 2023-05-17 PROCEDURE — 86481 TB AG RESPONSE T-CELL SUSP: CPT | Performed by: NURSE PRACTITIONER

## 2023-05-17 PROCEDURE — 84295 ASSAY OF SERUM SODIUM: CPT | Performed by: NURSE PRACTITIONER

## 2023-05-17 PROCEDURE — 87070 CULTURE OTHR SPECIMN AEROBIC: CPT | Performed by: PHYSICIAN ASSISTANT

## 2023-05-17 PROCEDURE — 99204 OFFICE O/P NEW MOD 45 MIN: CPT | Mod: 25 | Performed by: PHYSICIAN ASSISTANT

## 2023-05-17 PROCEDURE — 87075 CULTR BACTERIA EXCEPT BLOOD: CPT | Performed by: PHYSICIAN ASSISTANT
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
## 2023-05-18 ENCOUNTER — LAB REQUISITION (OUTPATIENT)
Dept: LAB | Facility: CLINIC | Age: 63
End: 2023-05-18

## 2023-05-18 DIAGNOSIS — E87.1 HYPO-OSMOLALITY AND HYPONATREMIA: ICD-10-CM

## 2023-05-19 ENCOUNTER — TELEPHONE (OUTPATIENT)
Dept: WOUND CARE | Facility: CLINIC | Age: 63
End: 2023-05-19
Payer: MEDICARE

## 2023-05-19 LAB
BACTERIA WND CULT: NORMAL
GAMMA INTERFERON BACKGROUND BLD IA-ACNC: 0.03 IU/ML
M TB IFN-G BLD-IMP: NEGATIVE
M TB IFN-G CD4+ BCKGRND COR BLD-ACNC: 9.97 IU/ML
MITOGEN IGNF BCKGRD COR BLD-ACNC: 0 IU/ML
MITOGEN IGNF BCKGRD COR BLD-ACNC: 0.01 IU/ML
QUANTIFERON MITOGEN: 10 IU/ML
QUANTIFERON NIL TUBE: 0.03 IU/ML
QUANTIFERON TB1 TUBE: 0.04 IU/ML
QUANTIFERON TB2 TUBE: 0.03
SODIUM SERPL-SCNC: 141 MMOL/L (ref 136–145)

## 2023-05-19 PROCEDURE — 84295 ASSAY OF SERUM SODIUM: CPT | Performed by: NURSE PRACTITIONER

## 2023-05-19 PROCEDURE — P9604 ONE-WAY ALLOW PRORATED TRIP: HCPCS | Performed by: NURSE PRACTITIONER

## 2023-05-19 PROCEDURE — 36415 COLL VENOUS BLD VENIPUNCTURE: CPT | Performed by: NURSE PRACTITIONER

## 2023-05-21 ENCOUNTER — LAB REQUISITION (OUTPATIENT)
Dept: LAB | Facility: CLINIC | Age: 63
End: 2023-05-21

## 2023-05-21 DIAGNOSIS — E87.1 HYPO-OSMOLALITY AND HYPONATREMIA: ICD-10-CM

## 2023-05-22 ENCOUNTER — TRANSITIONAL CARE UNIT VISIT (OUTPATIENT)
Dept: GERIATRICS | Facility: CLINIC | Age: 63
End: 2023-05-22
Payer: MEDICARE

## 2023-05-22 VITALS
DIASTOLIC BLOOD PRESSURE: 59 MMHG | WEIGHT: 144.6 LBS | OXYGEN SATURATION: 98 % | BODY MASS INDEX: 22.7 KG/M2 | HEART RATE: 75 BPM | HEIGHT: 67 IN | TEMPERATURE: 97.5 F | RESPIRATION RATE: 16 BRPM | SYSTOLIC BLOOD PRESSURE: 93 MMHG

## 2023-05-22 DIAGNOSIS — S51.002D ELBOW WOUND, LEFT, SUBSEQUENT ENCOUNTER: ICD-10-CM

## 2023-05-22 DIAGNOSIS — R42 VERTIGO: ICD-10-CM

## 2023-05-22 DIAGNOSIS — R33.9 URINARY RETENTION: ICD-10-CM

## 2023-05-22 DIAGNOSIS — E22.2 SIADH (SYNDROME OF INAPPROPRIATE ADH PRODUCTION) (H): Primary | ICD-10-CM

## 2023-05-22 DIAGNOSIS — E23.2 DIABETES INSIPIDUS (H): ICD-10-CM

## 2023-05-22 DIAGNOSIS — E78.5 DYSLIPIDEMIA: ICD-10-CM

## 2023-05-22 DIAGNOSIS — M35.2 BEHCET'S DISEASE (H): ICD-10-CM

## 2023-05-22 DIAGNOSIS — J45.20 MILD INTERMITTENT ASTHMA WITHOUT COMPLICATION: ICD-10-CM

## 2023-05-22 DIAGNOSIS — T07.XXXA MULTIPLE FRACTURES: ICD-10-CM

## 2023-05-22 DIAGNOSIS — G44.309 POST-TRAUMATIC HEADACHE, NOT INTRACTABLE, UNSPECIFIED CHRONICITY PATTERN: ICD-10-CM

## 2023-05-22 DIAGNOSIS — T07.XXXA MULTIPLE TRAUMA: ICD-10-CM

## 2023-05-22 DIAGNOSIS — G44.309 POST-TRAUMATIC HEADACHE, NOT INTRACTABLE, UNSPECIFIED CHRONICITY PATTERN: Primary | ICD-10-CM

## 2023-05-22 DIAGNOSIS — E43 SEVERE MALNUTRITION (H): ICD-10-CM
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
## 2023-05-22 DIAGNOSIS — R53.81 PHYSICAL DECONDITIONING: ICD-10-CM

## 2023-05-22 DIAGNOSIS — R51.0 POSITIONAL HEADACHE: ICD-10-CM

## 2023-05-22 DIAGNOSIS — K21.9 GASTROESOPHAGEAL REFLUX DISEASE WITHOUT ESOPHAGITIS: ICD-10-CM

## 2023-05-22 LAB — SODIUM SERPL-SCNC: 141 MMOL/L (ref 136–145)

## 2023-05-22 PROCEDURE — P9604 ONE-WAY ALLOW PRORATED TRIP: HCPCS | Performed by: NURSE PRACTITIONER

## 2023-05-22 PROCEDURE — 36415 COLL VENOUS BLD VENIPUNCTURE: CPT | Performed by: NURSE PRACTITIONER

## 2023-05-22 PROCEDURE — 99309 SBSQ NF CARE MODERATE MDM 30: CPT | Performed by: NURSE PRACTITIONER

## 2023-05-22 PROCEDURE — 84295 ASSAY OF SERUM SODIUM: CPT | Performed by: NURSE PRACTITIONER

## 2023-05-22 RX ORDER — BUPRENORPHINE 10 UG/H
1 PATCH TRANSDERMAL
Qty: 4 PATCH | Refills: 0 | Status: SHIPPED | OUTPATIENT
Start: 2023-05-22 | End: 2023-05-23
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
## 2023-05-23 DIAGNOSIS — T07.XXXA MULTIPLE FRACTURES: Primary | ICD-10-CM

## 2023-05-23 RX ORDER — BUPRENORPHINE 7.5 UG/H
1 PATCH TRANSDERMAL
Qty: 2 PATCH | Refills: 0 | Status: SHIPPED | OUTPATIENT
Start: 2023-05-23 | End: 2023-06-01

## 2023-05-24 LAB — BACTERIA WND CULT: NORMAL

## 2023-05-26 ENCOUNTER — TELEPHONE (OUTPATIENT)
Dept: NEUROLOGY | Facility: CLINIC | Age: 63
End: 2023-05-26

## 2023-05-26 ENCOUNTER — TRANSCRIBE ORDERS (OUTPATIENT)
Dept: OTHER | Age: 63
End: 2023-05-26

## 2023-05-26 ENCOUNTER — LAB REQUISITION (OUTPATIENT)
Dept: LAB | Facility: CLINIC | Age: 63
End: 2023-05-26

## 2023-05-26 DIAGNOSIS — Z00.01 ENCOUNTER FOR GENERAL ADULT MEDICAL EXAMINATION WITH ABNORMAL FINDINGS: ICD-10-CM

## 2023-05-26 DIAGNOSIS — R51.9 HEAD ACHE: ICD-10-CM

## 2023-05-26 DIAGNOSIS — H81.10 BPPV (BENIGN PAROXYSMAL POSITIONAL VERTIGO): ICD-10-CM

## 2023-05-26 DIAGNOSIS — S06.9XAA TBI (TRAUMATIC BRAIN INJURY) (H): Primary | ICD-10-CM

## 2023-05-26 LAB — SODIUM SERPL-SCNC: 142 MMOL/L (ref 136–145)

## 2023-05-26 PROCEDURE — 84295 ASSAY OF SERUM SODIUM: CPT | Performed by: NURSE PRACTITIONER

## 2023-05-26 NOTE — TELEPHONE ENCOUNTER
[facility] Call Center    Phone Message    May a detailed message be left on voicemail: yes     Reason for Call: Appointment Intake    Referring Provider Name: NORMA HEREDIA  Diagnosis and/or Symptoms:     TBI (traumatic brain injury) (H)  BPPV (benign paroxysmal positional vertigo)  Head ache    Pt's spouse Riccardo is requesting a call back to schedule the referral above. Writer is unsure of who to schedule with due to multiple referring diagnosis. Please call pt back to advise at # [phone]    Action Taken: Message routed to:  [facility] ([facility] Neurology     Travel Screening: Not Applicable

## 2023-05-27 ENCOUNTER — HEALTH MAINTENANCE LETTER (OUTPATIENT)
Age: 63
End: 2023-05-27

## 2023-05-27 DIAGNOSIS — R52 PAIN: ICD-10-CM

## 2023-05-27 RX ORDER — HYDROMORPHONE HYDROCHLORIDE 2 MG/1
2 TABLET ORAL EVERY 4 HOURS PRN
Qty: 18 TABLET | Refills: 0 | Status: SHIPPED | OUTPATIENT
Start: 2023-05-27 | End: 2023-06-02

## 2023-05-29 ENCOUNTER — LAB REQUISITION (OUTPATIENT)
Dept: LAB | Facility: CLINIC | Age: 63
End: 2023-05-29

## 2023-05-29 DIAGNOSIS — E87.1 HYPO-OSMOLALITY AND HYPONATREMIA: ICD-10-CM

## 2023-05-30 ENCOUNTER — TRANSITIONAL CARE UNIT VISIT (OUTPATIENT)
Dept: GERIATRICS | Facility: CLINIC | Age: 63
End: 2023-05-30
Payer: MEDICARE

## 2023-05-30 DIAGNOSIS — S51.002D ELBOW WOUND, LEFT, SUBSEQUENT ENCOUNTER: Primary | ICD-10-CM

## 2023-05-30 LAB — SODIUM SERPL-SCNC: 140 MMOL/L (ref 136–145)

## 2023-05-30 PROCEDURE — 84295 ASSAY OF SERUM SODIUM: CPT | Performed by: NURSE PRACTITIONER

## 2023-05-30 PROCEDURE — 99207 PR NO CHARGE LOS: CPT | Performed by: PHYSICIAN ASSISTANT

## 2023-05-30 PROCEDURE — 36415 COLL VENOUS BLD VENIPUNCTURE: CPT | Performed by: NURSE PRACTITIONER

## 2023-05-30 PROCEDURE — P9604 ONE-WAY ALLOW PRORATED TRIP: HCPCS | Performed by: NURSE PRACTITIONER
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
## 2023-05-31 ENCOUNTER — HOSPITAL ENCOUNTER (OUTPATIENT)
Dept: CT IMAGING | Facility: CLINIC | Age: 63
Discharge: HOME | End: 2023-05-31
Attending: PHYSICIAN ASSISTANT
Payer: MEDICARE

## 2023-05-31 ENCOUNTER — HOSPITAL ENCOUNTER (OUTPATIENT)
Dept: GENERAL RADIOLOGY | Facility: CLINIC | Age: 63
Discharge: HOME | End: 2023-05-31
Attending: NEUROLOGICAL SURGERY
Payer: MEDICARE

## 2023-05-31 DIAGNOSIS — S12.100D CLOSED DISPLACED FRACTURE OF SECOND CERVICAL VERTEBRA WITH ROUTINE HEALING, UNSPECIFIED FRACTURE MORPHOLOGY, SUBSEQUENT ENCOUNTER: ICD-10-CM

## 2023-05-31 DIAGNOSIS — S32.040D COMPRESSION FRACTURE OF L4 VERTEBRA WITH ROUTINE HEALING, SUBSEQUENT ENCOUNTER: ICD-10-CM

## 2023-05-31 PROCEDURE — 72080 X-RAY EXAM THORACOLMB 2/> VW: CPT

## 2023-05-31 PROCEDURE — 72125 CT NECK SPINE W/O DYE: CPT

## 2023-06-01 DIAGNOSIS — T07.XXXA MULTIPLE FRACTURES: ICD-10-CM

## 2023-06-01 RX ORDER — BUPRENORPHINE 10 UG/H
1 PATCH TRANSDERMAL
Qty: 1 PATCH | Refills: 0 | Status: SHIPPED | OUTPATIENT
Start: 2023-06-01 | End: 2023-06-02

## 2023-06-01 RX ORDER — BUPRENORPHINE 7.5 UG/H
PATCH TRANSDERMAL
Qty: 2 PATCH | Refills: 0 | Status: SHIPPED | OUTPATIENT
Start: 2023-06-01 | End: 2023-06-02

## 2023-06-02 ENCOUNTER — DISCHARGE SUMMARY NURSING HOME (OUTPATIENT)
Dept: GERIATRICS | Facility: CLINIC | Age: 63
End: 2023-06-02
Payer: COMMERCIAL

## 2023-06-02 ENCOUNTER — LAB REQUISITION (OUTPATIENT)
Dept: LAB | Facility: CLINIC | Age: 63
End: 2023-06-02
Payer: MEDICARE

## 2023-06-02 VITALS
OXYGEN SATURATION: 96 % | HEIGHT: 67 IN | TEMPERATURE: 97.4 F | WEIGHT: 144.6 LBS | DIASTOLIC BLOOD PRESSURE: 68 MMHG | RESPIRATION RATE: 18 BRPM | SYSTOLIC BLOOD PRESSURE: 112 MMHG | BODY MASS INDEX: 22.7 KG/M2 | HEART RATE: 94 BPM

## 2023-06-02 DIAGNOSIS — E87.1 HYPO-OSMOLALITY AND HYPONATREMIA: ICD-10-CM

## 2023-06-02 DIAGNOSIS — Z86.711 HISTORY OF PULMONARY EMBOLISM: Primary | ICD-10-CM

## 2023-06-02 DIAGNOSIS — R42 VERTIGO: ICD-10-CM

## 2023-06-02 DIAGNOSIS — E78.5 HYPERLIPIDEMIA, UNSPECIFIED HYPERLIPIDEMIA TYPE: ICD-10-CM

## 2023-06-02 DIAGNOSIS — R11.0 NAUSEA: ICD-10-CM

## 2023-06-02 DIAGNOSIS — H04.123 DRY EYES: ICD-10-CM

## 2023-06-02 DIAGNOSIS — E43 SEVERE MALNUTRITION (H): ICD-10-CM

## 2023-06-02 DIAGNOSIS — E23.2 DIABETES INSIPIDUS (H): ICD-10-CM

## 2023-06-02 DIAGNOSIS — R52 PAIN: ICD-10-CM

## 2023-06-02 DIAGNOSIS — T07.XXXA MULTIPLE FRACTURES: ICD-10-CM

## 2023-06-02 DIAGNOSIS — R53.81 PHYSICAL DECONDITIONING: ICD-10-CM

## 2023-06-02 DIAGNOSIS — R33.9 URINARY RETENTION: ICD-10-CM

## 2023-06-02 DIAGNOSIS — E78.5 DYSLIPIDEMIA: ICD-10-CM

## 2023-06-02 DIAGNOSIS — M35.2 BEHCET'S DISEASE (H): ICD-10-CM

## 2023-06-02 DIAGNOSIS — K21.9 GASTROESOPHAGEAL REFLUX DISEASE WITHOUT ESOPHAGITIS: ICD-10-CM

## 2023-06-02 DIAGNOSIS — S06.9X9D TRAUMATIC BRAIN INJURY WITH LOSS OF CONSCIOUSNESS, SUBSEQUENT ENCOUNTER: ICD-10-CM
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
## 2023-06-02 DIAGNOSIS — T07.XXXA MULTIPLE TRAUMA: ICD-10-CM

## 2023-06-02 DIAGNOSIS — Z86.711 HISTORY OF PULMONARY EMBOLISM: ICD-10-CM

## 2023-06-02 DIAGNOSIS — G44.309 POST-TRAUMATIC HEADACHE, NOT INTRACTABLE, UNSPECIFIED CHRONICITY PATTERN: ICD-10-CM

## 2023-06-02 DIAGNOSIS — K59.01 SLOW TRANSIT CONSTIPATION: ICD-10-CM

## 2023-06-02 DIAGNOSIS — Z97.8 INTRAVENOUS CATHETER IN PLACE: ICD-10-CM

## 2023-06-02 DIAGNOSIS — J45.20 MILD INTERMITTENT ASTHMA WITHOUT COMPLICATION: ICD-10-CM

## 2023-06-02 DIAGNOSIS — R51.0 POSITIONAL HEADACHE: ICD-10-CM

## 2023-06-02 DIAGNOSIS — S51.002D ELBOW WOUND, LEFT, SUBSEQUENT ENCOUNTER: ICD-10-CM

## 2023-06-02 DIAGNOSIS — E22.2 SIADH (SYNDROME OF INAPPROPRIATE ADH PRODUCTION) (H): Primary | ICD-10-CM

## 2023-06-02 LAB — SODIUM SERPL-SCNC: 142 MMOL/L (ref 136–145)

## 2023-06-02 PROCEDURE — 99316 NF DSCHRG MGMT 30 MIN+: CPT | Performed by: NURSE PRACTITIONER

## 2023-06-02 PROCEDURE — 84295 ASSAY OF SERUM SODIUM: CPT | Performed by: NURSE PRACTITIONER

## 2023-06-02 RX ORDER — AMOXICILLIN 250 MG
2 CAPSULE ORAL DAILY
Qty: 60 TABLET | Refills: 0 | Status: SHIPPED | OUTPATIENT
Start: 2023-06-02

## 2023-06-02 RX ORDER — HYDROMORPHONE HYDROCHLORIDE 2 MG/1
2 TABLET ORAL EVERY 4 HOURS PRN
Qty: 15 TABLET | Refills: 0 | Status: SHIPPED | OUTPATIENT
Start: 2023-06-02 | End: 2023-06-02

## 2023-06-02 RX ORDER — LIDOCAINE 4 G/G
PATCH TOPICAL EVERY 24 HOURS
Qty: 90 PATCH | Refills: 0 | Status: SHIPPED | OUTPATIENT
Start: 2023-06-02

## 2023-06-02 RX ORDER — PREGABALIN 100 MG/1
100 CAPSULE ORAL AT BEDTIME
Qty: 30 CAPSULE | Refills: 0 | Status: SHIPPED | OUTPATIENT
Start: 2023-06-02

## 2023-06-02 RX ORDER — BUPRENORPHINE 10 UG/H
1 PATCH TRANSDERMAL
Qty: 2 PATCH | Refills: 0 | Status: SHIPPED | OUTPATIENT
Start: 2023-06-02

## 2023-06-02 RX ORDER — CARBOXYMETHYLCELLULOSE SODIUM 5 MG/ML
2 SOLUTION/ DROPS OPHTHALMIC 2 TIMES DAILY
Qty: 50 EACH | Refills: 0 | Status: SHIPPED | OUTPATIENT
Start: 2023-06-02

## 2023-06-02 RX ORDER — HYDROMORPHONE HYDROCHLORIDE 2 MG/1
2 TABLET ORAL EVERY 4 HOURS PRN
Qty: 30 TABLET | Refills: 0 | Status: SHIPPED | OUTPATIENT
Start: 2023-06-02

## 2023-06-02 RX ORDER — METHOCARBAMOL 500 MG/1
500 TABLET, FILM COATED ORAL 4 TIMES DAILY PRN
Qty: 30 TABLET | Refills: 0 | Status: SHIPPED | OUTPATIENT
Start: 2023-06-02

## 2023-06-02 RX ORDER — AMOXICILLIN 250 MG
2 CAPSULE ORAL DAILY
COMMUNITY
End: 2023-06-02

## 2023-06-02 RX ORDER — MECLIZINE HYDROCHLORIDE 25 MG/1
25 TABLET ORAL EVERY 8 HOURS
Qty: 90 TABLET | Refills: 0 | Status: SHIPPED | OUTPATIENT
Start: 2023-06-02

## 2023-06-02 RX ORDER — CELECOXIB 200 MG/1
200 CAPSULE ORAL 2 TIMES DAILY
Qty: 60 CAPSULE | Refills: 0 | Status: SHIPPED | OUTPATIENT
Start: 2023-06-02

## 2023-06-02 RX ORDER — HYDROXYZINE PAMOATE 50 MG/1
50 CAPSULE ORAL EVERY 4 HOURS PRN
Qty: 30 CAPSULE | Refills: 0 | Status: SHIPPED | OUTPATIENT
Start: 2023-06-02

## 2023-06-02 RX ORDER — ONDANSETRON 4 MG/1
4 TABLET, FILM COATED ORAL EVERY 8 HOURS PRN
Qty: 30 TABLET | Refills: 0 | Status: SHIPPED | OUTPATIENT
Start: 2023-06-02

## 2023-06-02 RX ORDER — ROSUVASTATIN CALCIUM 5 MG/1
5 TABLET, COATED ORAL DAILY
Qty: 30 TABLET | Refills: 0 | Status: SHIPPED | OUTPATIENT
Start: 2023-06-02
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
## 2023-06-05 ENCOUNTER — TELEPHONE (OUTPATIENT)
Dept: GERIATRICS | Facility: CLINIC | Age: 63
End: 2023-06-05
Payer: MEDICARE

## 2023-06-05 NOTE — TELEPHONE ENCOUNTER
Prior Authorization Retail Medication Request    Medication/Dose: Buprenorphine 10 mcg/hr weekly patches   ICD code (if different than what is on RX):    Previously Tried and Failed:    Rationale:      Primary Insurance Name:  Medicare  Primary Insurance ID:  [id number]   Seconday Insurance Name: [facility]  Secondary Insurance ID: [id number]         Pharmacy Information (if different than what is on RX)  Name:    Phone:

## 2023-06-05 NOTE — TELEPHONE ENCOUNTER
Prior Authorization Retail Medication Request    Medication/Dose: Methocarbamol 500 mg tablets  ICD code (if different than what is on RX):    Previously Tried and Failed:    Rationale:      Primary Insurance Name:  Medicare  Primary Insurance ID:  [id number]   Seconday Insurance Name: [facility]  Secondary Insurance ID: [id number]       Pharmacy Information (if different than what is on RX)  Name:    Phone:

## 2023-06-08 NOTE — TELEPHONE ENCOUNTER
Central Prior Authorization Team   Phone: [phone]    PA Initiation    Medication: Methocarbamol 500 mg tablets  Insurance Company: [employer] Part D - Phone [phone] Fax [phone]  Pharmacy Filling the Rx: [facility] [address] [facility], [place] - [address]  Filling Pharmacy Phone: [phone]  Filling Pharmacy Fax:    Start Date: 6/8/2023

## 2023-06-08 NOTE — TELEPHONE ENCOUNTER
Central Prior Authorization Team   Phone: [phone]    PA Initiation    Medication: Buprenorphine 10 mcg/hr weekly patches   Insurance Company: [employer] - Phone [phone] Fax [phone]  Pharmacy Filling the Rx: [facility] [address] [place], [place] - [address]  Filling Pharmacy Phone: [phone]  Filling Pharmacy Fax:    Start Date: 6/8/2023

## 2023-06-08 NOTE — TELEPHONE ENCOUNTER
Prior Authorization Approval    Authorization Effective Date: 1/1/2023  Authorization Expiration Date: 6/7/2024  Medication: Buprenorphine 10 mcg/hr weekly patches   Approved Dose/Quantity:    Reference #:     Insurance Company: [employer] [phone] Fax [phone]  Expected CoPay:       CoPay Card Available:      Foundation Assistance Needed:    Which Pharmacy is filling the prescription (Not needed for infusion/clinic administered): [facility] [address] [facility] [address]  Pharmacy Notified: Yes  Patient Notified:  Pharmacy will notify patient

## 2023-06-08 NOTE — TELEPHONE ENCOUNTER
Prior Authorization Approval    Authorization Effective Date: 1/1/2023  Authorization Expiration Date: 6/7/2024  Medication: Methocarbamol 500 mg tablets  Approved Dose/Quantity:    Reference #:     Insurance Company: [employer] Part D - Phone [phone] Fax [phone]  Expected CoPay:       CoPay Card Available:      Foundation Assistance Needed:    Which Pharmacy is filling the prescription (Not needed for infusion/clinic administered): [facility] [address] [facility] [address]  Pharmacy Notified: Yes  Patient Notified:  Pharmacy will notify patient

## 2024-08-04 ENCOUNTER — HEALTH MAINTENANCE LETTER (OUTPATIENT)
Age: 64
End: 2024-08-04

## 2025-08-16 ENCOUNTER — HEALTH MAINTENANCE LETTER (OUTPATIENT)
Age: 65
End: 2025-08-16